# Patient Record
Sex: FEMALE | Race: WHITE | URBAN - METROPOLITAN AREA
[De-identification: names, ages, dates, MRNs, and addresses within clinical notes are randomized per-mention and may not be internally consistent; named-entity substitution may affect disease eponyms.]

---

## 2017-01-04 DIAGNOSIS — F51.01 PRIMARY INSOMNIA: Primary | ICD-10-CM

## 2017-01-04 RX ORDER — AMITRIPTYLINE HYDROCHLORIDE 100 MG/1
TABLET ORAL
Qty: 90 TABLET | Refills: 1 | Status: SHIPPED | OUTPATIENT
Start: 2017-01-04 | End: 2017-07-18

## 2017-01-04 NOTE — TELEPHONE ENCOUNTER
Pending Prescriptions:                       Disp   Refills    amitriptyline (ELAVIL) 100 MG tablet [Phar*90 tab*0        Sig: TAKE 1 TABLET BY MOUTH AT BEDTIME FOR INSOMNIA         Last Written Prescription Date: 10/6/2016  Last Fill Quantity: 90, # refills: 0  Last Office Visit with [facility], [facility] or [facility] prescribing provider: 12/5/2016        BP Readings from Last 3 Encounters:   12/05/16 104/72   10/20/16 125/79   10/11/16 115/74     Last PHQ-9 score on record= No flowsheet data found.

## 2017-07-18 DIAGNOSIS — F51.01 PRIMARY INSOMNIA: ICD-10-CM

## 2017-07-18 NOTE — TELEPHONE ENCOUNTER
Amitriptyline      Last Written Prescription Date: 01/04/2017  Last Fill Quantity: 90, # refills: 1  Last Office Visit with [facility], [facility] or [facility] prescribing provider: 12/05/2016        BP Readings from Last 3 Encounters:   12/05/16 104/72   10/20/16 125/79   10/11/16 115/74     Last PHQ-9 score on record= No flowsheet data found.

## 2017-07-19 RX ORDER — AMITRIPTYLINE HYDROCHLORIDE 100 MG/1
TABLET ORAL
Qty: 90 TABLET | Refills: 0 | Status: SHIPPED | OUTPATIENT
Start: 2017-07-19 | End: 2017-10-27

## 2017-07-19 NOTE — TELEPHONE ENCOUNTER
Prescription approved per [facility] Refill Protocol.  Due for PE Oct 2017  Candi MICHELLE RN

## 2017-10-27 DIAGNOSIS — F51.01 PRIMARY INSOMNIA: ICD-10-CM

## 2017-10-27 RX ORDER — AMITRIPTYLINE HYDROCHLORIDE 100 MG/1
TABLET ORAL
Qty: 30 TABLET | Refills: 0 | Status: SHIPPED | OUTPATIENT
Start: 2017-10-27 | End: 2017-11-26

## 2017-10-27 NOTE — TELEPHONE ENCOUNTER
Amitriptyline  Takes for insomnia  Medication is being filled for 1 time refill only due to:  Patient needs to be seen because it has been more than one year since last visit..  Due for physical.  Last 10/20/16  Nela Locke RN

## 2017-11-26 DIAGNOSIS — F51.01 PRIMARY INSOMNIA: ICD-10-CM

## 2017-11-27 RX ORDER — AMITRIPTYLINE HYDROCHLORIDE 100 MG/1
TABLET ORAL
Qty: 30 TABLET | Refills: 0 | Status: SHIPPED | OUTPATIENT
Start: 2017-11-27 | End: 2018-01-05

## 2017-11-27 NOTE — TELEPHONE ENCOUNTER
Given 1 month supply 10/27/17  Due for physical.  Last 10/20/16.  Spoke with patient. Scheduled her for her physical on 12/4/17.  Needs refill before appointment.   Refill sent for 1 month supply.  Nela Locke RN    Amitriptyline   Last Written Prescription Date: 10/27/2017  Last Fill Quantity: 30, # refills: 0  Last Office Visit with [facility] primary care provider:  10/20/2016

## 2017-12-31 DIAGNOSIS — F51.01 PRIMARY INSOMNIA: ICD-10-CM

## 2018-01-02 RX ORDER — AMITRIPTYLINE HYDROCHLORIDE 100 MG/1
TABLET ORAL
Refills: 0
Start: 2018-01-02

## 2018-01-02 NOTE — TELEPHONE ENCOUNTER
Requested Prescriptions   Pending Prescriptions Disp Refills     amitriptyline (ELAVIL) 100 MG tablet [Pharmacy Med Name: AMITRIPTYLINE  MG TAB] 30 tablet 0     Sig: TAKE 1 TABLET BY MOUTH AT BEDTIME FOR INSOMNIA    Tricyclic Antidepressants Protocol Failed    12/31/2017  1:40 AM       Failed - Blood pressure under 140/90    BP Readings from Last 3 Encounters:   12/05/16 104/72   10/20/16 125/79   10/11/16 115/74                Failed - Recent (12 mo) or future (30 d) visit with authorizing provider's specialty     Patient had office visit in the last year or has a visit in the next 30 days with authorizing provider.  See chart review.              Passed - Patient is age 18 or older       Passed - No active pregnancy on record       Passed - No positive pregnancy test in past 12 months

## 2018-01-05 DIAGNOSIS — F51.01 PRIMARY INSOMNIA: ICD-10-CM

## 2018-01-05 RX ORDER — AMITRIPTYLINE HYDROCHLORIDE 100 MG/1
TABLET ORAL
Qty: 7 TABLET | Refills: 0 | Status: SHIPPED | OUTPATIENT
Start: 2018-01-05 | End: 2018-01-10

## 2018-01-05 NOTE — TELEPHONE ENCOUNTER
Amitriptyline:  Given 1 month supply 11/27/17.  Due for OV.  Last seen 10/20/16  Cancelled appointment she had scheduled for 12/4/17.  Future OV 1/10/18. Refill sent for #7.  Nela Locke RN

## 2018-01-10 ENCOUNTER — OFFICE VISIT (OUTPATIENT)
Dept: FAMILY MEDICINE | Facility: CLINIC | Age: 28
End: 2018-01-10
Payer: COMMERCIAL

## 2018-01-10 VITALS
OXYGEN SATURATION: 98 % | BODY MASS INDEX: 29.36 KG/M2 | DIASTOLIC BLOOD PRESSURE: 76 MMHG | WEIGHT: 193.7 LBS | HEART RATE: 100 BPM | HEIGHT: 68 IN | TEMPERATURE: 99.4 F | SYSTOLIC BLOOD PRESSURE: 114 MMHG

## 2018-01-10 DIAGNOSIS — G43.919 INTRACTABLE MIGRAINE WITHOUT STATUS MIGRAINOSUS, UNSPECIFIED MIGRAINE TYPE: Primary | ICD-10-CM

## 2018-01-10 DIAGNOSIS — N76.0 VAGINITIS AND VULVOVAGINITIS: ICD-10-CM

## 2018-01-10 DIAGNOSIS — F51.01 PRIMARY INSOMNIA: ICD-10-CM

## 2018-01-10 DIAGNOSIS — Z71.84 TRAVEL ADVICE ENCOUNTER: ICD-10-CM

## 2018-01-10 LAB
SPECIMEN SOURCE: NORMAL
WET PREP SPEC: NORMAL

## 2018-01-10 PROCEDURE — 87210 SMEAR WET MOUNT SALINE/INK: CPT | Performed by: FAMILY MEDICINE

## 2018-01-10 PROCEDURE — 99214 OFFICE O/P EST MOD 30 MIN: CPT | Performed by: FAMILY MEDICINE

## 2018-01-10 PROCEDURE — 87591 N.GONORRHOEAE DNA AMP PROB: CPT | Performed by: FAMILY MEDICINE

## 2018-01-10 PROCEDURE — 87491 CHLMYD TRACH DNA AMP PROBE: CPT | Performed by: FAMILY MEDICINE

## 2018-01-10 RX ORDER — AMITRIPTYLINE HYDROCHLORIDE 100 MG/1
TABLET ORAL
Qty: 90 TABLET | Refills: 3 | Status: SHIPPED | OUTPATIENT
Start: 2018-01-10

## 2018-01-10 RX ORDER — ONDANSETRON 4 MG/1
4 TABLET, FILM COATED ORAL EVERY 8 HOURS PRN
Qty: 12 TABLET | Refills: 11 | Status: SHIPPED | OUTPATIENT
Start: 2018-01-10

## 2018-01-10 RX ORDER — TRIAMCINOLONE ACETONIDE 1 MG/G
OINTMENT TOPICAL
Qty: 30 G | Refills: 1 | Status: SHIPPED | OUTPATIENT
Start: 2018-01-10

## 2018-01-10 RX ORDER — NAPROXEN 500 MG/1
500 TABLET ORAL 2 TIMES DAILY WITH MEALS
Qty: 180 TABLET | Refills: 1 | Status: SHIPPED | OUTPATIENT
Start: 2018-01-10

## 2018-01-10 RX ORDER — METOCLOPRAMIDE 10 MG/1
10 TABLET ORAL 2 TIMES DAILY PRN
Qty: 20 TABLET | Refills: 1 | Status: SHIPPED | OUTPATIENT
Start: 2018-01-10

## 2018-01-10 NOTE — PROGRESS NOTES
SUBJECTIVE:   Evangelina Cruz is a 27 year old female who presents to clinic today for the following health issues:      Medication Followup of amitryptiline    Taking Medication as prescribed: yes    Side Effects:  Not working as well, wondering about tolerance    Medication Helping Symptoms:  yes     She has a history of migraines. Has had several trials of different medications. Has had lots of side effects with triptan's. Has tried both Maxalt and Imitrex. Does have headaches about once a month. Is not sure if they truly correlate with periods. Usually is able to help them go away because she works from home and she can sleep or rest. Has not tried a lot of NSAIDs and specifically has not tried naproxen. Has tried both topiramate and propranolol and has had side effects or ineffective response to these. Does have a Mirena IUD and feels this has helped with some of her symptoms. Feels amitriptyline helps somewhat.Still has migraines 1-2 times per month  she used rescue meds - dizzy and feeling funny  Tried a nausea med and this helps      Dry and itchy vaginal area  Noted a crease which bleeds  Hs tried renew which helps but not permanent  Started about 8 months ago  No new soaps or vaginal applications    Had std screening over a year ago    amitriptylline working pretty well  Helps headaches    Cutting out carbs and changing her diet would like to see if this helps with headaches as well as diet      Is taking fiber, daily MVI and probiotic          Problem list and histories reviewed & adjusted, as indicated.  Additional history: as documented    Patient Active Problem List   Diagnosis     Chronic tension headaches     Palpitations     Low back pain     Intractable migraine without status migrainosus, unspecified migraine type     mirena     Gastroesophageal reflux disease without esophagitis     PCOS (polycystic ovarian syndrome)     Past Surgical History:   Procedure Laterality Date     ESOPHAGOSCOPY,  "GASTROSCOPY, DUODENOSCOPY (EGD), COMBINED N/A 10/13/2016    Procedure: COMBINED ESOPHAGOSCOPY, GASTROSCOPY, DUODENOSCOPY (EGD);  Surgeon: Ha Dueñas MD;  Location:  GI       Social History   Substance Use Topics     Smoking status: Never Smoker     Smokeless tobacco: Never Used     Alcohol use 0.0 oz/week     0 Standard drinks or equivalent per week     History reviewed. No pertinent family history.      Current Outpatient Prescriptions   Medication Sig Dispense Refill     ondansetron (ZOFRAN) 4 MG tablet Take 1 tablet (4 mg) by mouth every 8 hours as needed for nausea With migraines 12 tablet 11     metoclopramide (REGLAN) 10 MG tablet Take 1 tablet (10 mg) by mouth 2 times daily as needed For migraine treatment 20 tablet 1     naproxen (NAPROSYN) 500 MG tablet Take 1 tablet (500 mg) by mouth 2 times daily (with meals) 180 tablet 1     amitriptyline (ELAVIL) 100 MG tablet TAKE 1 TABLET BY MOUTH AT BEDTIME FOR INSOMNIA 90 tablet 3     triamcinolone (KENALOG) 0.1 % ointment Apply sparingly to affected area three times daily for 5-7 days. 30 g 1     levonorgestrel (MIRENA) 20 MCG/24HR IUD 1 each by Intrauterine route once       [DISCONTINUED] amitriptyline (ELAVIL) 100 MG tablet TAKE 1 TABLET BY MOUTH AT BEDTIME FOR INSOMNIA 7 tablet 0     omeprazole (PRILOSEC) 20 MG CR capsule TAKE 1 CAPSULE (20 MG) BY MOUTH 2 TIMES DAILY (Patient not taking: Reported on 1/10/2018) 60 capsule 1         Reviewed and updated as needed this visit by clinical staffTobacco  Allergies  Med Hx  Surg Hx  Fam Hx  Soc Hx      Reviewed and updated as needed this visit by Provider         ROS:  Constitutional, HEENT, cardiovascular, pulmonary, gi and gu systems are negative, except as otherwise noted.      OBJECTIVE:                                                    /76  Pulse 100  Temp 99.4  F (37.4  C) (Tympanic)  Ht 5' 8\" (1.727 m)  Wt 193 lb 11.2 oz (87.9 kg)  SpO2 98%  BMI 29.45 kg/m2  Body mass index is " 29.45 kg/(m^2).  GENERAL APPEARANCE: healthy, alert and no distress   (female): Does have erythematous ring around labium minora that is uniform all the way around the vaginal introitus.    Diagnostic test results:  Diagnostic Test Results:  Results for orders placed or performed in visit on 01/10/18 (from the past 24 hour(s))   Wet prep   Result Value Ref Range    Specimen Description Vagina     Wet Prep No Trichomonas seen     Wet Prep No clue cells seen     Wet Prep No yeast seen           ASSESSMENT/PLAN:                                                    1. Intractable migraine without status migrainosus, unspecified migraine type  Trial of different medications for migraine rescue. She has had a lot of side effects with triptans  We also discussed possible supplementation with magnesium C after visit summary notes. This might work as a preventative.  - metoclopramide (REGLAN) 10 MG tablet; Take 1 tablet (10 mg) by mouth 2 times daily as needed For migraine treatment  Dispense: 20 tablet; Refill: 1  - naproxen (NAPROSYN) 500 MG tablet; Take 1 tablet (500 mg) by mouth 2 times daily (with meals)  Dispense: 180 tablet; Refill: 1    Okay to use Zofran if Reglan as noted above is not helpful  - ondansetron (ZOFRAN) 4 MG tablet; Take 1 tablet (4 mg) by mouth every 8 hours as needed for nausea With migraines  Dispense: 12 tablet; Refill: 11    3. Primary insomnia  Medications refilled these do seem to help with ensuring good sleep certainly if symptoms are worsening we can try other medications  - amitriptyline (ELAVIL) 100 MG tablet; TAKE 1 TABLET BY MOUTH AT BEDTIME FOR INSOMNIA  Dispense: 90 tablet; Refill: 3    4. Vaginitis and vulvovaginitis  Discussed having her try triamcinolone area does seem to be quite inflamed and irritated in a diffuse pattern. No signs of yeast noted. If this is not helping can have her see GYN for their opinion  - Wet prep  - NEISSERIA GONORRHOEA PCR  - CHLAMYDIA TRACHOMATIS PCR  -  triamcinolone (KENALOG) 0.1 % ointment; Apply sparingly to affected area three times daily for 5-7 days.  Dispense: 30 g; Refill: 1      Follow-up as needed    Kellie Thao MD  [employer]

## 2018-01-10 NOTE — NURSING NOTE
"Chief Complaint   Patient presents with     Recheck Medication     amitryptiline--dx still listed as insomnia     Vaginal Problem     dryness, wondering if from IUD--over the past 8 months     /76  Pulse 100  Temp 99.4  F (37.4  C) (Tympanic)  Ht 5' 8\" (1.727 m)  Wt 193 lb 11.2 oz (87.9 kg)  SpO2 98%  BMI 29.45 kg/m2 Estimated body mass index is 29.45 kg/(m^2) as calculated from the following:    Height as of this encounter: 5' 8\" (1.727 m).    Weight as of this encounter: 193 lb 11.2 oz (87.9 kg).  Medication Reconciliation: complete      Health Maintenance due pending provider review:  NONE    n/a    Bety Wilkes CMA  "

## 2018-01-10 NOTE — MR AVS SNAPSHOT
After Visit Summary   1/10/2018    Evangelina Cruz    MRN: [id number]           Patient Information     Date Of Birth          1990        Visit Information        Provider Department      1/10/2018 12:30 PM Kellie Thao MD [facility]        Today's Diagnoses     Vaginitis and vulvovaginitis    -  1    Travel advice encounter        Intractable migraine without status migrainosus, unspecified migraine type          Care Instructions    Consider  Migrelief for migraine relief or an equivalent  Usee preventatively daily    Consider Frovatriptan as preventative (similar to your rescue medication)          Follow-ups after your visit        Who to contact     If you have questions or need follow up information about today's clinic visit or your schedule please contact [facility] directly at [phone].  Normal or non-critical lab and imaging results will be communicated to you by MyChart, letter or phone within 4 business days after the clinic has received the results. If you do not hear from us within 7 days, please contact the clinic through MyChart or phone. If you have a critical or abnormal lab result, we will notify you by phone as soon as possible.  Submit refill requests through [employer] or call your pharmacy and they will forward the refill request to us. Please allow 3 business days for your refill to be completed.          Additional Information About Your Visit        MyChart Information     [employer] gives you secure access to your electronic health record. If you see a primary care provider, you can also send messages to your care team and make appointments. If you have questions, please call your primary care clinic.  If you do not have a primary care provider, please call [phone] and they will assist you.        Care EveryWhere ID     This is your Care EveryWhere ID. This could be used by other organizations to access your [facility]  "records  [id number]        Your Vitals Were     Pulse Temperature Height Pulse Oximetry BMI (Body Mass Index)       100 99.4  F (37.4  C) (Tympanic) 5' 8\" (1.727 m) 98% 29.45 kg/m2        Blood Pressure from Last 3 Encounters:   01/10/18 114/76   12/05/16 104/72   10/20/16 125/79    Weight from Last 3 Encounters:   01/10/18 193 lb 11.2 oz (87.9 kg)   12/05/16 180 lb 4 oz (81.8 kg)   10/20/16 181 lb 5 oz (82.2 kg)              We Performed the Following     CHLAMYDIA TRACHOMATIS PCR     NEISSERIA GONORRHOEA PCR     Wet prep          Today's Medication Changes          These changes are accurate as of: 1/10/18  1:04 PM.  If you have any questions, ask your nurse or doctor.               Start taking these medicines.        Dose/Directions    metoclopramide 10 MG tablet   Commonly known as:  REGLAN   Used for:  Intractable migraine without status migrainosus, unspecified migraine type   Started by:  Kellie Thao MD        Dose:  10 mg   Take 1 tablet (10 mg) by mouth 2 times daily as needed For migraine treatment   Quantity:  20 tablet   Refills:  1       naproxen 500 MG tablet   Commonly known as:  NAPROSYN   Used for:  Intractable migraine without status migrainosus, unspecified migraine type   Started by:  Kellie Thao MD        Dose:  500 mg   Take 1 tablet (500 mg) by mouth 2 times daily (with meals)   Quantity:  180 tablet   Refills:  1       ondansetron 4 MG tablet   Commonly known as:  ZOFRAN   Used for:  Travel advice encounter   Started by:  Kellie Thao MD        Dose:  4 mg   Take 1 tablet (4 mg) by mouth every 8 hours as needed for nausea With migraines   Quantity:  12 tablet   Refills:  11            Where to get your medicines      These medications were sent to [place] [address] [place] [address] [address]     Phone:  [phone]     metoclopramide 10 MG tablet    naproxen 500 MG tablet    ondansetron 4 MG tablet "                Primary Care Provider Office Phone # Fax #    MaranaLouisa Thao [phone] [address] [address]        Equal Access to Services     CRISS BARROS : Kevon lacey will Sonikko, waaxda luqadaha, qaybta kaalmada adelorda, jeffery fernandez radha vaca. So [facility] [phone].    ATENCIÓN: Si habla español, tiene a garcia disposición servicios gratuitos de asistencia lingüística. Llame al [phone].    We comply with applicable federal civil rights laws and [place] laws. We do not discriminate on the basis of race, color, national origin, age, disability, sex, sexual orientation, or gender identity.            Thank you!     Thank you for choosing [facility]  for your care. Our goal is always to provide you with excellent care. Hearing back from our patients is one way we can continue to improve our services. Please take a few minutes to complete the written survey that you may receive in the mail after your visit with us. Thank you!             Your Updated Medication List - Protect others around you: Learn how to safely use, store and throw away your medicines at www.disposemymeds.org.          This list is accurate as of: 1/10/18  1:04 PM.  Always use your most recent med list.                   Brand Name Dispense Instructions for use Diagnosis    amitriptyline 100 MG tablet    ELAVIL    7 tablet    TAKE 1 TABLET BY MOUTH AT BEDTIME FOR INSOMNIA    Primary insomnia       levonorgestrel 20 MCG/24HR IUD    MIRENA     1 each by Intrauterine route once        metoclopramide 10 MG tablet    REGLAN    20 tablet    Take 1 tablet (10 mg) by mouth 2 times daily as needed For migraine treatment    Intractable migraine without status migrainosus, unspecified migraine type       naproxen 500 MG tablet    NAPROSYN    180 tablet    Take 1 tablet (500 mg) by mouth 2 times daily (with meals)    Intractable migraine without status  migrainosus, unspecified migraine type       omeprazole 20 MG CR capsule    priLOSEC    60 capsule    TAKE 1 CAPSULE (20 MG) BY MOUTH 2 TIMES DAILY    Gastroesophageal reflux disease without esophagitis       ondansetron 4 MG tablet    ZOFRAN    12 tablet    Take 1 tablet (4 mg) by mouth every 8 hours as needed for nausea With migraines    Travel advice encounter

## 2018-01-10 NOTE — PATIENT INSTRUCTIONS
Consider  Migrelief for migraine relief or an equivalent  Usee preventatively daily    Consider Frovatriptan as preventative (similar to your rescue medication)

## 2018-01-11 ENCOUNTER — MYC MEDICAL ADVICE (OUTPATIENT)
Dept: FAMILY MEDICINE | Facility: CLINIC | Age: 28
End: 2018-01-11

## 2018-01-11 LAB
C TRACH DNA SPEC QL NAA+PROBE: NEGATIVE
N GONORRHOEA DNA SPEC QL NAA+PROBE: NEGATIVE
SPECIMEN SOURCE: NORMAL
SPECIMEN SOURCE: NORMAL

## 2018-01-11 NOTE — TELEPHONE ENCOUNTER
The labs did not show yeast or BV as the cause but it still could be a dermatitis or irritation that's causing her symptoms. I would recommend trying the steroid cream/ointment that I sent into her pharmacy and her symptoms are still persistent then we can have her see OB/GYN    Kellie Thao MD

## 2018-01-17 ENCOUNTER — NURSE TRIAGE (OUTPATIENT)
Dept: NURSING | Facility: CLINIC | Age: 28
End: 2018-01-17

## 2018-01-18 NOTE — TELEPHONE ENCOUNTER
Additional Information    Negative: [1] Caller is not with the adult (patient) AND [2] reporting urgent symptoms    Negative: Lab result questions    Negative: Medication questions    Negative: Caller cannot be reached by phone    Negative: Caller has already spoken to PCP or another triager    Negative: RN needs further essential information from caller in order to complete triage    Negative: Requesting regular office appointment    Negative: [1] Caller requesting NON-URGENT health information AND [2] PCP's office is the best resource    Negative: Health Information question, no triage required and triager able to answer question    Negative: General information question, no triage required and triager able to answer question    Negative: Question about upcoming scheduled test, no triage required and triager able to answer question    Negative: [1] Caller is not with the adult (patient) AND [2] probable NON-URGENT symptoms    [1] Follow-up call to recent contact AND [2] information only call, no triage required    Protocols used: INFORMATION ONLY CALL-ADULT-HAILEY Hutchinson calls and says that she wants to know if her  Ordered her Amitriptyline medication. RN then checked [facility] and saw that pt's  did order the Amitriptyline, on 1/10/18, and e-prescribed it to pt's pharmacy. RN told this to pt. And pt. Says that she is going to call her pharmacy again.

## 2018-02-01 ENCOUNTER — MYC MEDICAL ADVICE (OUTPATIENT)
Dept: FAMILY MEDICINE | Facility: CLINIC | Age: 28
End: 2018-02-01

## 2019-01-17 DIAGNOSIS — F51.01 PRIMARY INSOMNIA: ICD-10-CM

## 2019-01-17 RX ORDER — AMITRIPTYLINE HYDROCHLORIDE 100 MG/1
TABLET ORAL
Start: 2019-01-17

## 2019-01-17 NOTE — TELEPHONE ENCOUNTER
"Patient established care with new FP provider 6/27/18 (Ale)  Nela Locke, RN    Requested Prescriptions   Refused Prescriptions Disp Refills     amitriptyline (ELAVIL) 100 MG tablet [Pharmacy Med Name: AMITRIPTYLINE  MG TAB]       Sig: TAKE 1 TABLET BY MOUTH AT BEDTIME FOR INSOMNIA    Tricyclic Agents ( Annual appt and no PHQ9) Failed - 1/17/2019  3:51 AM       Failed - Blood Pressure under 140/90 in past 12 mos    BP Readings from Last 3 Encounters:   01/10/18 114/76   12/05/16 104/72   10/20/16 125/79                Failed - Recent (12 mo) or future (30 days) visit within authorizing provider's specialty    Patient had office visit in the last 12 months or has a visit in the next 30 days with authorizing provider or within the authorizing provider's specialty.  See \"Patient Info\" tab in inbasket, or \"Choose Columns\" in Meds & Orders section of the refill encounter.             Passed - Medication is active on med list       Passed - Patient is age 18 or older       Passed - Patient is not pregnant       Passed - No positive pregnancy test on record in past 12 mos            "

## 2020-03-01 ENCOUNTER — HEALTH MAINTENANCE LETTER (OUTPATIENT)
Age: 30
End: 2020-03-01

## 2021-08-31 ENCOUNTER — TRANSFERRED RECORDS (OUTPATIENT)
Dept: HEALTH INFORMATION MANAGEMENT | Facility: CLINIC | Age: 31
End: 2021-08-31

## 2024-01-31 ENCOUNTER — TRANSFERRED RECORDS (OUTPATIENT)
Dept: HEALTH INFORMATION MANAGEMENT | Facility: CLINIC | Age: 34
End: 2024-01-31

## 2024-08-29 ENCOUNTER — TRANSFERRED RECORDS (OUTPATIENT)
Dept: HEALTH INFORMATION MANAGEMENT | Facility: CLINIC | Age: 34
End: 2024-08-29